# Patient Record
Sex: MALE | Race: WHITE | Employment: FULL TIME | URBAN - METROPOLITAN AREA
[De-identification: names, ages, dates, MRNs, and addresses within clinical notes are randomized per-mention and may not be internally consistent; named-entity substitution may affect disease eponyms.]

---

## 2018-05-07 ENCOUNTER — OFFICE VISIT (OUTPATIENT)
Dept: FAMILY MEDICINE CLINIC | Facility: CLINIC | Age: 43
End: 2018-05-07

## 2018-05-07 VITALS
SYSTOLIC BLOOD PRESSURE: 122 MMHG | BODY MASS INDEX: 30.1 KG/M2 | WEIGHT: 215 LBS | HEART RATE: 66 BPM | HEIGHT: 71 IN | TEMPERATURE: 98 F | DIASTOLIC BLOOD PRESSURE: 82 MMHG | RESPIRATION RATE: 20 BRPM | OXYGEN SATURATION: 98 %

## 2018-05-07 DIAGNOSIS — H65.191 ACUTE MEE (MIDDLE EAR EFFUSION), RIGHT: Primary | ICD-10-CM

## 2018-05-07 PROCEDURE — 99202 OFFICE O/P NEW SF 15 MIN: CPT | Performed by: NURSE PRACTITIONER

## 2018-05-07 NOTE — PATIENT INSTRUCTIONS
Flonase daily  Sudafed as directed  Benadryl at night  Follow up for worsening symptoms or no improvement    Earache, No Infection (Adult)  Earaches can happen without an infection.  This occurs when air and fluid build up behind the eardrum causing a fee these medicines. Aspirin should never be used in anyone under 25years of age who is ill with a fever. It may cause severe liver damage. · You may use over-the-counter decongestants such as phenylephrine or pseudoephedrine.  But they are not always helpful

## 2018-05-08 NOTE — PROGRESS NOTES
CHIEF COMPLAINT:   Patient presents with:  Ear Problem: clogged Right ear s/s for 2 days      HPI:   Joseluis Veras is a 40year old male who presents to clinic today with complaints of right ear feeling clogged. Has had for 3  days.  Pain is described a THROAT: oral mucosa pink, moist. Posterior pharynx not erythematous or injected. No exudates. NECK: supple, non-tender  LUNGS: clear to auscultation bilaterally, no wheezes or rhonchi. Breathing is non labored.   CARDIO: RRR without murmur  EXTREMITIES: no It often takes from several weeks up to 3 months for the fluid to clear on its own. Oral pain relievers and ear drops help if there is pain. Decongestants and antihistamines sometimes help. Antibiotics don't help since there is no infection.  Your doctor ma · Fever of 100.4°F (38°C) or higher, or as directed by your healthcare provider  · Fluid or blood draining from the ear  · Headache or sinus pain  · Stiff neck  · Unusual drowsiness or confusion  Date Last Reviewed: 10/1/2016  © 4209-1888 [employer]

## 2021-11-13 ENCOUNTER — HOSPITAL ENCOUNTER (EMERGENCY)
Age: 46
Discharge: HOME | End: 2021-11-13
Attending: EMERGENCY MEDICINE
Payer: OTHER MISCELLANEOUS

## 2021-11-13 ENCOUNTER — APPOINTMENT (OUTPATIENT)
Dept: GENERAL RADIOLOGY | Age: 46
End: 2021-11-13
Attending: NURSE PRACTITIONER
Payer: OTHER MISCELLANEOUS

## 2021-11-13 VITALS
OXYGEN SATURATION: 97 % | HEART RATE: 78 BPM | HEIGHT: 71 IN | RESPIRATION RATE: 14 BRPM | WEIGHT: 218 LBS | BODY MASS INDEX: 30.52 KG/M2 | DIASTOLIC BLOOD PRESSURE: 87 MMHG | SYSTOLIC BLOOD PRESSURE: 147 MMHG | TEMPERATURE: 98 F

## 2021-11-13 DIAGNOSIS — S86.912A KNEE STRAIN, LEFT, INITIAL ENCOUNTER: Primary | ICD-10-CM

## 2021-11-13 PROCEDURE — 99283 EMERGENCY DEPT VISIT LOW MDM: CPT | Performed by: EMERGENCY MEDICINE

## 2021-11-13 PROCEDURE — 73562 X-RAY EXAM OF KNEE 3: CPT | Performed by: NURSE PRACTITIONER

## 2021-11-13 NOTE — ED PROVIDER NOTES
Patient Seen in: THE [facility] Emergency Department In [place]      History   Patient presents with:  Leg or Foot Injury    Stated Complaint: L knee injury    Subjective:   60-year-old male with left knee pain.   Patient states he is a  and was j Musculoskeletal:         General: Normal range of motion. Left knee: Tenderness present over the lateral joint line. No LCL laxity. Normal patellar mobility. Instability Tests: Anterior drawer test negative. Posterior drawer test negative.  Anter Disposition:  Discharge  11/13/2021 12:22 pm    Follow-up:  MEDICAL Straughn, AN AFFILIATE OF [facility] in [facility]. [id number] [id number]  [address] [facility] 93  [address]

## 2021-11-13 NOTE — ED INITIAL ASSESSMENT (HPI)
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 Pt with pain that radiates down.   Has not taken meds for pain

## 2021-11-16 ENCOUNTER — OFFICE VISIT (OUTPATIENT)
Dept: OCCUPATIONAL MEDICINE | Age: 46
End: 2021-11-16
Attending: PHYSICIAN ASSISTANT

## 2021-11-16 DIAGNOSIS — M25.562 ACUTE PAIN OF LEFT KNEE: Primary | ICD-10-CM

## 2021-11-16 DIAGNOSIS — S80.02XD CONTUSION OF LEFT KNEE, SUBSEQUENT ENCOUNTER: ICD-10-CM

## 2021-11-18 ENCOUNTER — HOSPITAL ENCOUNTER (OUTPATIENT)
Dept: MRI IMAGING | Age: 46
Discharge: HOME | End: 2021-11-18
Attending: PREVENTIVE MEDICINE
Payer: OTHER MISCELLANEOUS

## 2021-11-18 DIAGNOSIS — S80.02XD CONTUSION OF LEFT KNEE, SUBSEQUENT ENCOUNTER: ICD-10-CM

## 2021-11-18 DIAGNOSIS — M25.562 ACUTE PAIN OF LEFT KNEE: ICD-10-CM

## 2021-11-18 PROCEDURE — 73721 MRI JNT OF LWR EXTRE W/O DYE: CPT | Performed by: PREVENTIVE MEDICINE

## 2021-12-02 ENCOUNTER — TELEPHONE (OUTPATIENT)
Dept: ORTHOPEDICS CLINIC | Facility: CLINIC | Age: 46
End: 2021-12-02

## 2021-12-02 DIAGNOSIS — Z01.89 ENCOUNTER FOR LOWER EXTREMITY COMPARISON IMAGING STUDY: Primary | ICD-10-CM

## 2021-12-02 DIAGNOSIS — M25.562 LEFT KNEE PAIN, UNSPECIFIED CHRONICITY: ICD-10-CM

## 2021-12-02 NOTE — TELEPHONE ENCOUNTER
Reviewed patients chart, xray orders are required. Order placed for left knee xrays.  Please contact patient advise to arrive 30 mins prior to patients appt to complete x-ray order and schedule patients xray appt-Thank you

## (undated) NOTE — LETTER
Date & Time: 11/13/2021, 12:33 PM  Patient: Ricky Wyatt  Encounter Provider(s):    MD Nabeel Julian APRN       To Whom It May Concern:    Brisa Caitlin was seen and treated in our department on 11/13/2021.  He should not retur